# Patient Record
Sex: FEMALE | Race: WHITE | NOT HISPANIC OR LATINO | Employment: PART TIME | URBAN - METROPOLITAN AREA
[De-identification: names, ages, dates, MRNs, and addresses within clinical notes are randomized per-mention and may not be internally consistent; named-entity substitution may affect disease eponyms.]

---
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
## 2023-02-21 ENCOUNTER — OFFICE VISIT (OUTPATIENT)
Dept: URGENT CARE | Facility: CLINIC | Age: 18
End: 2023-02-21
Payer: COMMERCIAL

## 2023-02-21 VITALS
RESPIRATION RATE: 20 BRPM | TEMPERATURE: 99 F | HEIGHT: 60 IN | BODY MASS INDEX: 21.95 KG/M2 | HEART RATE: 85 BPM | DIASTOLIC BLOOD PRESSURE: 80 MMHG | OXYGEN SATURATION: 100 % | WEIGHT: 111.8 LBS | SYSTOLIC BLOOD PRESSURE: 100 MMHG

## 2023-02-21 DIAGNOSIS — L03.116 CELLULITIS OF LEFT LOWER EXTREMITY: ICD-10-CM

## 2023-02-21 PROCEDURE — 99213 OFFICE O/P EST LOW 20 MIN: CPT | Performed by: NURSE PRACTITIONER

## 2023-02-21 RX ORDER — CEPHALEXIN 500 MG/1
500 CAPSULE ORAL 2 TIMES DAILY
Qty: 10 CAPSULE | Refills: 0 | Status: SHIPPED | OUTPATIENT
Start: 2023-02-21 | End: 2023-02-26

## 2023-02-22 ASSESSMENT — ENCOUNTER SYMPTOMS
FEVER: 0
CHILLS: 0
MYALGIAS: 1

## 2023-02-22 NOTE — PROGRESS NOTES
"Subjective:     Sharlene Beverly is a 18 y.o. female who presents for Wound Infection (X 1.5 weeks blister/scab from shoe on left lower shin oozing green pus.)      Wound Infection  Associated symptoms include myalgias and a rash. Pertinent negatives include no chills or fever.   Pt presents for evaluation of a new problem. Stephanie is a very pleasant 18-year-old female presents to urgent care today with complaints of a wound to her left lower leg that first occurred 10 days ago with wearing boots she states that she developed a blister that has progressively increased in size and has and has become more painful.  She does note redness around her blister as well as mild drainage.  She has been applying Neosporin with no improvement.  Negative for fevers or chills.    Review of Systems   Constitutional:  Negative for chills and fever.   Musculoskeletal:  Positive for myalgias.   Skin:  Positive for rash.     PMH: History reviewed. No pertinent past medical history.  ALLERGIES: No Known Allergies  SURGHX:   Past Surgical History:   Procedure Laterality Date    TONSILLECTOMY AND ADENOIDECTOMY       SOCHX:   Social History     Socioeconomic History    Marital status: Single   Tobacco Use    Smoking status: Never    Smokeless tobacco: Never   Vaping Use    Vaping Use: Former    Substances: Nicotine, Flavoring    Devices: Disposable   Substance and Sexual Activity    Alcohol use: Not Currently    Drug use: Never     Comment: unsure if not currently or never    Sexual activity: Yes     Birth control/protection: Condom     FH:   Family History   Problem Relation Age of Onset    Diabetes Maternal Grandmother     Hypertension Maternal Grandmother     Stroke Maternal Grandmother          Objective:   /80   Pulse 85   Temp 37.2 °C (99 °F) (Temporal)   Resp 20   Ht 1.534 m (5' 0.39\")   Wt 50.7 kg (111 lb 12.8 oz)   LMP 01/25/2023   SpO2 100%   BMI 21.55 kg/m²     Physical Exam  Vitals and nursing note reviewed. "   Constitutional:       General: She is not in acute distress.     Appearance: Normal appearance. She is normal weight. She is not ill-appearing or toxic-appearing.   HENT:      Head: Normocephalic.      Right Ear: External ear normal.      Left Ear: External ear normal.      Nose: No congestion or rhinorrhea.      Mouth/Throat:      Pharynx: No oropharyngeal exudate or posterior oropharyngeal erythema.   Eyes:      General:         Right eye: No discharge.         Left eye: No discharge.      Pupils: Pupils are equal, round, and reactive to light.   Pulmonary:      Effort: Pulmonary effort is normal.   Abdominal:      General: Abdomen is flat.   Musculoskeletal:         General: Normal range of motion.      Cervical back: Normal range of motion and neck supple.   Skin:     General: Skin is dry.      Comments: 3 cm superficial wound present to anterior left lower extremity with surrounding erythema and blistering present.  There is pain with palpation.  No active drainage or bleeding.   Neurological:      General: No focal deficit present.      Mental Status: She is alert and oriented to person, place, and time. Mental status is at baseline.   Psychiatric:         Mood and Affect: Mood normal.         Behavior: Behavior normal.         Thought Content: Thought content normal.         Judgment: Judgment normal.         Assessment/Plan:   Assessment    1. Cellulitis of left lower extremity  mupirocin (BACTROBAN) 2 % Ointment    cephALEXin (KEFLEX) 500 MG Cap        Wound care instructions discussed with patient.  Patient to clean wound with soap and water and pat dry.  Mupirocin ointment sent to pharmacy.  I do believe that this wound will heal well with topical antibiotic ointment.  Keflex also sent to pharmacy for patient to start if erythema becomes more widespread or if she develops fevers or chills.  She is in agreement with plan of care. Please note that this dictation was created using voice recognition  software. I have made every reasonable attempt to correct obvious errors, but I expect that there may be errors of grammar and possibly content that I did not discover before finalizing the note.    Pt educated on red flags and when to seek treatment back in ER or UC.

## 2023-03-19 ENCOUNTER — OFFICE VISIT (OUTPATIENT)
Dept: URGENT CARE | Facility: CLINIC | Age: 18
End: 2023-03-19
Payer: COMMERCIAL

## 2023-03-19 VITALS
TEMPERATURE: 98.3 F | DIASTOLIC BLOOD PRESSURE: 76 MMHG | RESPIRATION RATE: 16 BRPM | HEART RATE: 96 BPM | OXYGEN SATURATION: 99 % | SYSTOLIC BLOOD PRESSURE: 110 MMHG | BODY MASS INDEX: 17.94 KG/M2 | HEIGHT: 61 IN | WEIGHT: 95 LBS

## 2023-03-19 DIAGNOSIS — L23.1 ALLERGIC CONTACT DERMATITIS DUE TO ADHESIVES: ICD-10-CM

## 2023-03-19 DIAGNOSIS — L03.116 CELLULITIS OF LEFT LOWER EXTREMITY: ICD-10-CM

## 2023-03-19 PROCEDURE — 99214 OFFICE O/P EST MOD 30 MIN: CPT | Performed by: PHYSICIAN ASSISTANT

## 2023-03-19 RX ORDER — TRIAMCINOLONE ACETONIDE 1 MG/G
1 CREAM TOPICAL 2 TIMES DAILY
Qty: 30 G | Refills: 0 | Status: SHIPPED | OUTPATIENT
Start: 2023-03-19

## 2023-03-19 ASSESSMENT — ENCOUNTER SYMPTOMS
CHILLS: 0
GASTROINTESTINAL NEGATIVE: 1
MUSCULOSKELETAL NEGATIVE: 1
FATIGUE: 0
SORE THROAT: 0
RESPIRATORY NEGATIVE: 1
VOMITING: 0
CARDIOVASCULAR NEGATIVE: 1
FEVER: 0

## 2023-03-20 NOTE — PROGRESS NOTES
Subjective     Sharlene Beverly is a very pleasant 18 y.o. female who presents with Rash (L ankle )            Patient initially seen in urgent care on 2/21/2023.  She had a blister on her left lower leg which was infection versus irritation.  She was started on mupirocin ointment and given a contingent antibiotic prescription which patient did not take.  Symptoms were improving however over the last few days have been worsening.  The initial blister has resolved but there is still an area of deep erythema, edema and tenderness.  Now she has a she border around the initial wound where the tissue adhesive was.  She is still using mupirocin.  She is yet to take Keflex.  She denies fever, joint pain or other constitutional symptoms    Rash  This is a new problem. The current episode started 1 to 4 weeks ago. The problem is unchanged. The affected locations include the left lower leg. The rash is characterized by swelling, redness, pain and burning. Pertinent negatives include no fatigue, fever, joint pain, sore throat or vomiting.       PMH:  has no past medical history of ASTHMA or Diabetes.  MEDS:   Current Outpatient Medications:     triamcinolone acetonide (KENALOG) 0.1 % Cream, Apply 1 Application. topically 2 times a day., Disp: 30 g, Rfl: 0  ALLERGIES: No Known Allergies  SURGHX:   Past Surgical History:   Procedure Laterality Date    TONSILLECTOMY AND ADENOIDECTOMY       SOCHX:  reports that she has never smoked. She has never used smokeless tobacco. She reports that she does not currently use alcohol. She reports that she does not use drugs.  FH: family history includes Diabetes in her maternal grandmother; Hypertension in her maternal grandmother; Stroke in her maternal grandmother.    Review of Systems   Constitutional:  Negative for chills, fatigue and fever.   HENT:  Negative for sore throat.    Respiratory: Negative.     Cardiovascular: Negative.    Gastrointestinal: Negative.  Negative for vomiting.  "  Musculoskeletal: Negative.  Negative for joint pain.   Skin:  Positive for itching and rash.       Medications, Allergies, and current problem list reviewed today in Epic           Objective     /76 (BP Location: Left arm, Patient Position: Sitting, BP Cuff Size: Adult)   Pulse 96   Temp 36.8 °C (98.3 °F) (Temporal)   Resp 16   Ht 1.549 m (5' 1\")   Wt 43.1 kg (95 lb)   LMP 01/25/2023   SpO2 99%   BMI 17.95 kg/m²      Physical Exam  Vitals and nursing note reviewed.   Constitutional:       General: She is not in acute distress.     Appearance: Normal appearance. She is well-developed. She is not ill-appearing, toxic-appearing or diaphoretic.   HENT:      Head: Normocephalic and atraumatic.      Right Ear: Hearing and external ear normal.      Left Ear: Hearing and external ear normal.      Nose: Nose normal.      Mouth/Throat:      Dentition: Normal dentition. No dental caries.   Eyes:      General:         Right eye: No discharge.         Left eye: No discharge.      Conjunctiva/sclera: Conjunctivae normal.   Neck:      Thyroid: No thyromegaly.      Trachea: No tracheal deviation.   Cardiovascular:      Rate and Rhythm: Normal rate and regular rhythm.      Heart sounds: Normal heart sounds.   Pulmonary:      Effort: Pulmonary effort is normal. No respiratory distress.      Breath sounds: Normal breath sounds. No wheezing.   Abdominal:      General: Abdomen is flat.      Palpations: Abdomen is soft.   Musculoskeletal:      Cervical back: Normal range of motion and neck supple.   Skin:     General: Skin is warm and dry.      Nails: There is no clubbing.             Comments: Area of erythema, edema and slight tenderness.  Was compared to picture in previous charts.  Now there is a well demarcated papular pruritic erythematous rash around the wound consistent with tissue adhesive contact dermatitis.  There is no red streaking, open lesions or discharge.  No bony tenderness and range of motion " surrounding joints within normal limits.   Neurological:      General: No focal deficit present.      Mental Status: She is alert and oriented to person, place, and time.   Psychiatric:         Mood and Affect: Mood normal.         Behavior: Behavior normal.         Thought Content: Thought content normal.         Judgment: Judgment normal.                           Assessment & Plan     Very pleasant 18-year-old female presenting for reevaluation of her left lower leg wound and rash.  Was initially seen on 2/21/2023 and cellulitis from a blister.  She was started on mupirocin and given a contingent Keflex prescription which she did not take.  She notes symptoms did improve but over the last week they have worsened.  She continues to have redness, swelling and slight tenderness at the area of the initial blister.  She now has a erythematous papular pruritic well demarcated rash over around the initial wound consistent with tissue adhesive allergic dermatitis.  There are no open lesions or discharge.  There is no red streaking or bony tenderness.  No fluctuance or induration.  Remainder of exam benign/reassuring and vital signs are normal.  Patient is otherwise asymptomatic.  I did instruct patient to start Keflex for presumed ongoing cellulitis.  She now has a secondary allergic contact dermatitis for which I will prescribe triamcinolone.  She is to keep the wound open and stop using mupirocin other OTC meds and conservative measures including wound care discussed.    1. Cellulitis of left lower extremity        2. Allergic contact dermatitis due to adhesives  triamcinolone acetonide (KENALOG) 0.1 % Cream          Return to clinic or go to ED if symptoms worsen or persist. Red flag symptoms and indications for ED discussed at length. Patient/Parent/Guardian voices understanding. Follow-up with your primary care provider in 3-5 days. All side effects and potential interactions of prescribed medication discussed  including allergic response, GI upset, tendon injury, rash, sedation etc.    Please note that this dictation was created using voice recognition software. I have made every reasonable attempt to correct obvious errors, but I expect that there are errors of grammar and possibly content that I did not discover before finalizing the note.

## 2023-08-14 ENCOUNTER — OFFICE VISIT (OUTPATIENT)
Dept: URGENT CARE | Facility: CLINIC | Age: 18
End: 2023-08-14
Payer: COMMERCIAL

## 2023-08-14 VITALS
RESPIRATION RATE: 14 BRPM | HEIGHT: 61 IN | TEMPERATURE: 97.7 F | OXYGEN SATURATION: 100 % | BODY MASS INDEX: 17.94 KG/M2 | SYSTOLIC BLOOD PRESSURE: 116 MMHG | WEIGHT: 95 LBS | HEART RATE: 60 BPM | DIASTOLIC BLOOD PRESSURE: 70 MMHG

## 2023-08-14 DIAGNOSIS — L08.9 INSECT BITES OF MULTIPLE SITES, INFECTED: ICD-10-CM
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
## 2023-08-14 DIAGNOSIS — T07.XXXA INSECT BITES OF MULTIPLE SITES, INFECTED: ICD-10-CM

## 2023-08-14 PROCEDURE — 3078F DIAST BP <80 MM HG: CPT | Performed by: NURSE PRACTITIONER

## 2023-08-14 PROCEDURE — 99214 OFFICE O/P EST MOD 30 MIN: CPT | Performed by: NURSE PRACTITIONER

## 2023-08-14 PROCEDURE — 3074F SYST BP LT 130 MM HG: CPT | Performed by: NURSE PRACTITIONER

## 2023-08-14 RX ORDER — DOXYCYCLINE HYCLATE 100 MG/1
100 CAPSULE ORAL 2 TIMES DAILY
Qty: 10 CAPSULE | Refills: 0 | Status: SHIPPED | OUTPATIENT
Start: 2023-08-14 | End: 2023-08-19

## 2023-08-14 ASSESSMENT — ENCOUNTER SYMPTOMS
FEVER: 0
DIZZINESS: 0
MYALGIAS: 0
CHILLS: 0
TINGLING: 0
SENSORY CHANGE: 0
HEADACHES: 0

## 2023-08-14 NOTE — PROGRESS NOTES
Subjective     Sharlene Beverly is a 18 y.o. female who presents with Bug Bite            HPI  New problem.  Patient is a 18 old female who presents with multiple bug bites on her lower extremities with a yellowish discharge that she has noticed today.  She reports that these are itchy.  She has not taken any medications for this.  She states that the bug bites appeared over the weekend but the discharge she has just noticed today.    Patient has no known allergies.  Current Outpatient Medications on File Prior to Visit   Medication Sig Dispense Refill    triamcinolone acetonide (KENALOG) 0.1 % Cream Apply 1 Application. topically 2 times a day. (Patient not taking: Reported on 8/14/2023) 30 g 0     No current facility-administered medications on file prior to visit.     Social History     Socioeconomic History    Marital status: Single     Spouse name: Not on file    Number of children: Not on file    Years of education: Not on file    Highest education level: Not on file   Occupational History    Not on file   Tobacco Use    Smoking status: Never    Smokeless tobacco: Never   Vaping Use    Vaping Use: Former    Substances: Nicotine, Flavoring    Devices: Disposable   Substance and Sexual Activity    Alcohol use: Not Currently    Drug use: Never     Comment: unsure if not currently or never    Sexual activity: Yes     Birth control/protection: Condom   Other Topics Concern    Not on file   Social History Narrative    Not on file     Social Determinants of Health     Financial Resource Strain: Not on file   Food Insecurity: Not on file   Transportation Needs: Not on file   Physical Activity: Not on file   Stress: Not on file   Social Connections: Not on file   Intimate Partner Violence: Not on file   Housing Stability: Not on file     Breast Cancer-related family history is not on file.    Review of Systems   Constitutional:  Negative for chills, fever and malaise/fatigue.   Musculoskeletal:  Negative for  "myalgias.   Skin:  Positive for itching and rash.   Neurological:  Negative for dizziness, tingling, sensory change and headaches.   All other systems reviewed and are negative.             Objective     /70 (BP Location: Right arm, Patient Position: Sitting, BP Cuff Size: Adult long)   Pulse 60   Temp 36.5 °C (97.7 °F) (Temporal)   Resp 14   Ht 1.549 m (5' 1\")   Wt 43.1 kg (95 lb)   SpO2 100%   BMI 17.95 kg/m²      Physical Exam  Constitutional:       Appearance: Normal appearance. She is not ill-appearing.   Musculoskeletal:         General: Normal range of motion.   Skin:     General: Skin is warm and dry.      Comments: Patient with multiple small raised insect bites with a yellowish discharge from several of them.  There is no surrounding erythema or swelling noted on exam.   Neurological:      General: No focal deficit present.      Mental Status: She is alert and oriented to person, place, and time.   Psychiatric:         Mood and Affect: Mood normal.                           Assessment & Plan        1. Insect bites of multiple sites, infected  doxycycline (VIBRAMYCIN) 100 MG Cap        Otc hydrocortisone for the itching.  Keep clean with soap and water only.   Differential diagnosis, natural history, supportive care, and indications for immediate follow-up were discussed.                "